# Patient Record
Sex: MALE | Race: WHITE
[De-identification: names, ages, dates, MRNs, and addresses within clinical notes are randomized per-mention and may not be internally consistent; named-entity substitution may affect disease eponyms.]

---

## 2020-08-21 ENCOUNTER — HOSPITAL ENCOUNTER (OUTPATIENT)
Dept: HOSPITAL 19 - ZCOL.LAB | Age: 80
End: 2020-08-21
Payer: MEDICARE

## 2020-08-21 DIAGNOSIS — R41.0: Primary | ICD-10-CM

## 2020-08-21 LAB
ALBUMIN SERPL-MCNC: 4.1 GM/DL (ref 3.5–5)
ALP SERPL-CCNC: 196 U/L (ref 50–136)
ALT SERPL-CCNC: 161 U/L (ref 4–49)
ANION GAP SERPL CALC-SCNC: 7 MMOL/L (ref 7–16)
AST SERPL-CCNC: 176 U/L (ref 15–37)
BASOPHILS # BLD: 0 10*3/UL (ref 0–0.2)
BASOPHILS NFR BLD AUTO: 0.2 % (ref 0–2)
BILIRUB SERPL-MCNC: 1.3 MG/DL (ref 0–1)
BUN SERPL-MCNC: 16 MG/DL (ref 9–20)
CALCIUM SERPL-MCNC: 9.1 MG/DL (ref 8.4–10.2)
CHLORIDE SERPL-SCNC: 99 MMOL/L (ref 98–107)
CO2 SERPL-SCNC: 29 MMOL/L (ref 22–30)
CREAT SERPL-SCNC: 0.81 UMOL/L (ref 0.66–1.25)
EOSINOPHIL # BLD: 0.3 10*3/UL (ref 0–0.7)
EOSINOPHIL NFR BLD: 5.4 % (ref 0–4)
ERYTHROCYTE [DISTWIDTH] IN BLOOD BY AUTOMATED COUNT: 13.1 % (ref 11.5–14.5)
GLUCOSE SERPL-MCNC: 137 MG/DL (ref 74–106)
GRANULOCYTES # BLD AUTO: 63.4 % (ref 42.2–75.2)
HCT VFR BLD AUTO: 45.4 % (ref 42–52)
HGB BLD-MCNC: 15.6 G/DL (ref 13.5–18)
LYMPHOCYTES # BLD: 1.5 10*3/UL (ref 1.2–3.4)
LYMPHOCYTES NFR BLD: 24.7 % (ref 20–51)
MCH RBC QN AUTO: 31 PG (ref 27–31)
MCHC RBC AUTO-ENTMCNC: 34 G/DL (ref 33–37)
MCV RBC AUTO: 91 FL (ref 80–100)
MONOCYTES # BLD: 0.3 10*3/UL (ref 0.1–0.6)
MONOCYTES NFR BLD AUTO: 5.6 % (ref 1.7–9.3)
NEUTROPHILS # BLD: 3.8 10*3/UL (ref 1.4–6.5)
PH UR STRIP.AUTO: 6 [PH] (ref 5–8)
PLATELET # BLD AUTO: 168 K/MM3 (ref 130–400)
PMV BLD AUTO: 10.2 FL (ref 7.4–10.4)
POTASSIUM SERPL-SCNC: 3.5 MMOL/L (ref 3.4–5)
PROT SERPL-MCNC: 7.1 GM/DL (ref 6.4–8.2)
RBC # BLD AUTO: 4.98 M/MM3 (ref 4.2–5.6)
RBC # UR: (no result) /HPF
SODIUM SERPL-SCNC: 136 MMOL/L (ref 137–145)
SP GR UR STRIP.AUTO: 1 (ref 1–1.03)
URN COLLECT METHOD CLASS: (no result)
WBC # UR: (no result) /HPF

## 2020-08-24 ENCOUNTER — HOSPITAL ENCOUNTER (OUTPATIENT)
Dept: HOSPITAL 19 - COL.RAD | Age: 80
End: 2020-08-24
Attending: INTERNAL MEDICINE
Payer: MEDICARE

## 2020-08-24 DIAGNOSIS — R41.0: ICD-10-CM

## 2020-08-24 DIAGNOSIS — K80.20: Primary | ICD-10-CM

## 2020-08-24 DIAGNOSIS — R74.8: ICD-10-CM

## 2020-08-31 ENCOUNTER — HOSPITAL ENCOUNTER (OUTPATIENT)
Dept: HOSPITAL 19 - ZCOL.LAB | Age: 80
End: 2020-08-31
Payer: MEDICARE

## 2020-08-31 DIAGNOSIS — G20: Primary | ICD-10-CM

## 2020-08-31 LAB
ALBUMIN SERPL-MCNC: 4.4 GM/DL (ref 3.5–5)
ALP SERPL-CCNC: 163 U/L (ref 50–136)
ALT SERPL-CCNC: 45 U/L (ref 4–49)
ANION GAP SERPL CALC-SCNC: 10 MMOL/L (ref 7–16)
AST SERPL-CCNC: 40 U/L (ref 15–37)
BASOPHILS # BLD: 0 10*3/UL (ref 0–0.2)
BASOPHILS NFR BLD AUTO: 0.3 % (ref 0–2)
BILIRUB SERPL-MCNC: 1.1 MG/DL (ref 0–1)
BUN SERPL-MCNC: 14 MG/DL (ref 9–20)
CALCIUM SERPL-MCNC: 9.4 MG/DL (ref 8.4–10.2)
CHLORIDE SERPL-SCNC: 102 MMOL/L (ref 98–107)
CO2 SERPL-SCNC: 30 MMOL/L (ref 22–30)
CREAT SERPL-SCNC: 0.88 UMOL/L (ref 0.66–1.25)
EOSINOPHIL # BLD: 0.3 10*3/UL (ref 0–0.7)
EOSINOPHIL NFR BLD: 4.9 % (ref 0–4)
ERYTHROCYTE [DISTWIDTH] IN BLOOD BY AUTOMATED COUNT: 12.4 % (ref 11.5–14.5)
GLUCOSE SERPL-MCNC: 183 MG/DL (ref 74–106)
GRANULOCYTES # BLD AUTO: 62.9 % (ref 42.2–75.2)
HCT VFR BLD AUTO: 47 % (ref 42–52)
HGB BLD-MCNC: 16.5 G/DL (ref 13.5–18)
LYMPHOCYTES # BLD: 1.6 10*3/UL (ref 1.2–3.4)
LYMPHOCYTES NFR BLD: 25.5 % (ref 20–51)
MCH RBC QN AUTO: 31 PG (ref 27–31)
MCHC RBC AUTO-ENTMCNC: 35 G/DL (ref 33–37)
MCV RBC AUTO: 89 FL (ref 80–100)
MONOCYTES # BLD: 0.4 10*3/UL (ref 0.1–0.6)
MONOCYTES NFR BLD AUTO: 5.9 % (ref 1.7–9.3)
NEUTROPHILS # BLD: 3.8 10*3/UL (ref 1.4–6.5)
PLATELET # BLD AUTO: 188 K/MM3 (ref 130–400)
PMV BLD AUTO: 9.5 FL (ref 7.4–10.4)
POTASSIUM SERPL-SCNC: 3.8 MMOL/L (ref 3.4–5)
PROT SERPL-MCNC: 7.1 GM/DL (ref 6.4–8.2)
RBC # BLD AUTO: 5.28 M/MM3 (ref 4.2–5.6)
SODIUM SERPL-SCNC: 143 MMOL/L (ref 137–145)

## 2020-09-03 ENCOUNTER — HOSPITAL ENCOUNTER (OUTPATIENT)
Dept: HOSPITAL 19 - SDCO | Age: 80
LOS: 1 days | Discharge: HOME | End: 2020-09-04
Attending: SURGERY
Payer: MEDICARE

## 2020-09-03 VITALS — HEART RATE: 89 BPM | DIASTOLIC BLOOD PRESSURE: 71 MMHG | SYSTOLIC BLOOD PRESSURE: 117 MMHG

## 2020-09-03 VITALS — DIASTOLIC BLOOD PRESSURE: 87 MMHG | HEART RATE: 88 BPM | SYSTOLIC BLOOD PRESSURE: 139 MMHG

## 2020-09-03 VITALS — HEART RATE: 84 BPM | DIASTOLIC BLOOD PRESSURE: 97 MMHG | SYSTOLIC BLOOD PRESSURE: 176 MMHG | TEMPERATURE: 97.6 F

## 2020-09-03 VITALS — HEART RATE: 88 BPM | DIASTOLIC BLOOD PRESSURE: 63 MMHG | SYSTOLIC BLOOD PRESSURE: 130 MMHG

## 2020-09-03 VITALS — HEART RATE: 50 BPM | DIASTOLIC BLOOD PRESSURE: 99 MMHG | SYSTOLIC BLOOD PRESSURE: 179 MMHG | TEMPERATURE: 98.2 F

## 2020-09-03 VITALS — DIASTOLIC BLOOD PRESSURE: 73 MMHG | HEART RATE: 88 BPM | SYSTOLIC BLOOD PRESSURE: 115 MMHG

## 2020-09-03 VITALS — TEMPERATURE: 98.1 F | SYSTOLIC BLOOD PRESSURE: 160 MMHG | HEART RATE: 83 BPM | DIASTOLIC BLOOD PRESSURE: 102 MMHG

## 2020-09-03 VITALS — DIASTOLIC BLOOD PRESSURE: 100 MMHG | TEMPERATURE: 97.7 F | HEART RATE: 87 BPM | SYSTOLIC BLOOD PRESSURE: 159 MMHG

## 2020-09-03 VITALS — HEART RATE: 91 BPM | SYSTOLIC BLOOD PRESSURE: 132 MMHG | DIASTOLIC BLOOD PRESSURE: 85 MMHG | TEMPERATURE: 98.6 F

## 2020-09-03 VITALS — DIASTOLIC BLOOD PRESSURE: 69 MMHG | SYSTOLIC BLOOD PRESSURE: 119 MMHG | HEART RATE: 87 BPM

## 2020-09-03 VITALS — BODY MASS INDEX: 30.66 KG/M2 | WEIGHT: 195.33 LBS | HEIGHT: 67 IN

## 2020-09-03 DIAGNOSIS — F32.9: ICD-10-CM

## 2020-09-03 DIAGNOSIS — G25.81: ICD-10-CM

## 2020-09-03 DIAGNOSIS — G20: ICD-10-CM

## 2020-09-03 DIAGNOSIS — E03.9: ICD-10-CM

## 2020-09-03 DIAGNOSIS — Z96.82: ICD-10-CM

## 2020-09-03 DIAGNOSIS — Z79.899: ICD-10-CM

## 2020-09-03 DIAGNOSIS — K80.10: Primary | ICD-10-CM

## 2020-09-03 DIAGNOSIS — Z20.828: ICD-10-CM

## 2020-09-03 DIAGNOSIS — F41.9: ICD-10-CM

## 2020-09-03 DIAGNOSIS — Z85.828: ICD-10-CM

## 2020-09-03 DIAGNOSIS — F02.80: ICD-10-CM

## 2020-09-03 DIAGNOSIS — R79.89: ICD-10-CM

## 2020-09-03 NOTE — NUR
PATIENT IS ORIENTED BUT DROWSY POST OP. PATIENT HAS HX OF PARKINSON'S,
IMPLANTED STIMULATOR TURNED BACK ON IN PACU. VSS. DENIES PAIN. ABD LAP SITES
X5 ARE CD&I WITH SWIFTSET CLOSURE. ABD IS SOFT WITH POSITIVE BOWL SOUNDS. NO
C/O N/V. PATIENT VOIDED 140CC UPON ARRIVAL TO ROOM USING URINAL. IV FLUIDS
INFUSING INTO LEFT HAND VIA PUMP. HEAD TO TOE ASSESSMENT COMPLETE. DAUGHTER AT
BEDSIDE. CALL LIGHT IN REACH.

## 2020-09-03 NOTE — NUR
Patient sitting up in bed, has complaint of a headache that he has had
throughout the day. A&O, slow to answer and looks for guidance from daughter,
who is at the bedside. VSS, BP elevated, doctor aware. IV CDI, fluids
infusing. Patient doesnt have much of an appetite, daughter encouraging
patient to eat. Lap site abdomenx5 CDI. No further needs expressed from the
patient. Call light within reach. Bed alarm on

## 2020-09-03 NOTE — NUR
PT HAS GOWN OFF, HAS PULLED IV OUT, ANGIOCATH INTACT. HAS PICKED LEFT GLUE
FROM PART OF LEFT ABD ROBOTIC SITE. IS ORIENTED, BUT FORGETFUL. ASSISTED TO
BATHROOM, GAIT UNSTEADY REQUIRES 1-2 ASSIST. VOIDS AND BACK TO BED. BED ALARM
ON FOR SAFETY. PLACED STERI STRIPS TO ROBOTIC SITE ON LEFT.

## 2020-09-03 NOTE — NUR
PT IS AGAIN WITHOUT HIS GOWN ON. TAKES HS MEDS ONE AT A TIME AND NEW SL PLACED
TO RIGHT FOREARM. PT HAS PICKED AT [facility] ROBOTIC SITE, GLUE IS INTACT. PT
APOLOGIZES FOR BEING "EXPOSED". RE-ORIENTED AT THIS TIME.

## 2020-09-04 VITALS — SYSTOLIC BLOOD PRESSURE: 160 MMHG | HEART RATE: 79 BPM | DIASTOLIC BLOOD PRESSURE: 100 MMHG | TEMPERATURE: 98.7 F

## 2020-09-04 VITALS — HEART RATE: 79 BPM | SYSTOLIC BLOOD PRESSURE: 131 MMHG | TEMPERATURE: 98.6 F | DIASTOLIC BLOOD PRESSURE: 96 MMHG

## 2020-09-04 NOTE — NUR
PT HAS GOWN OFF, IS INCONTINENT OF URINE AND DEPENDS CHANGED. PLACED BANDAIDS
OVER 2 OF THE ROBOTIC SITES.

## 2020-09-04 NOTE — NUR
Patient assisted to the bathroom, he voided, but did miss toliet. Patient
ambulated to the chair with walker. Sat up in chair for breakfast. High fall
risk protocol followed

## 2020-09-04 NOTE — NUR
Assessment charted. PT resting in chair at side of bed. Daughter at bedside,
thrilled with pt's improvements since yesterday. Pt able to answer all
orientation questions except year. Pain is 3/10 to LUQ, has mild headache,
refusing any pharmacological interventions at this time.  Denies needs, INT to
RW. Will continue to moitor.

## 2020-09-04 NOTE — NUR
Discharge teaching completed at this time. PT INT dc'd, tip tinact. Pt
received dsischarge packet, reviewed f/u and discussed bathing restrictions.
Pt left wtih all belongings, escorted out via w/c by myself, daughter to drive
home, criteria met.

## 2020-09-04 NOTE — NUR
Report received from HOLLIS Walton. Pt in bed resting, bed alarm on. Pts
umbilical lap site and LUQ lap site have been picked overnight by pt despite
efforts to minimalize.  3 other lap sites are doing well. Pt has TV on to
news, denies needs, will continue to monitor.

## 2020-09-23 ENCOUNTER — HOSPITAL ENCOUNTER (OUTPATIENT)
Dept: HOSPITAL 19 - COL.RAD | Age: 80
End: 2020-09-23
Payer: MEDICARE

## 2020-09-23 DIAGNOSIS — G20: Primary | ICD-10-CM

## 2020-09-23 DIAGNOSIS — Z96.82: ICD-10-CM

## 2020-09-23 DIAGNOSIS — N39.46: ICD-10-CM

## 2021-02-12 ENCOUNTER — HOSPITAL ENCOUNTER (EMERGENCY)
Dept: HOSPITAL 19 - COL.ER | Age: 81
Discharge: HOME | End: 2021-02-12
Attending: FAMILY MEDICINE
Payer: MEDICARE

## 2021-02-12 VITALS — TEMPERATURE: 96.6 F | DIASTOLIC BLOOD PRESSURE: 118 MMHG | HEART RATE: 61 BPM | SYSTOLIC BLOOD PRESSURE: 167 MMHG

## 2021-02-12 VITALS — BODY MASS INDEX: 32.21 KG/M2 | WEIGHT: 200.4 LBS | HEIGHT: 65.98 IN

## 2021-02-12 DIAGNOSIS — Z79.890: ICD-10-CM

## 2021-02-12 DIAGNOSIS — Z88.8: ICD-10-CM

## 2021-02-12 DIAGNOSIS — F02.80: ICD-10-CM

## 2021-02-12 DIAGNOSIS — G20: Primary | ICD-10-CM

## 2021-02-12 LAB
ALBUMIN SERPL-MCNC: 4.4 GM/DL (ref 3.5–5)
ALP SERPL-CCNC: 66 U/L (ref 50–136)
ALT SERPL-CCNC: 4 U/L (ref 4–49)
ANION GAP SERPL CALC-SCNC: 4 MMOL/L (ref 7–16)
AST SERPL-CCNC: 27 U/L (ref 15–37)
BASOPHILS # BLD: 0 10*3/UL (ref 0–0.2)
BASOPHILS NFR BLD AUTO: 0.4 % (ref 0–2)
BILIRUB SERPL-MCNC: 0.8 MG/DL (ref 0–1)
BUN SERPL-MCNC: 20 MG/DL (ref 9–20)
CALCIUM SERPL-MCNC: 9.3 MG/DL (ref 8.4–10.2)
CHLORIDE SERPL-SCNC: 102 MMOL/L (ref 98–107)
CO2 SERPL-SCNC: 35 MMOL/L (ref 22–30)
CREAT SERPL-SCNC: 0.89 UMOL/L (ref 0.66–1.25)
CRP SERPL-MCNC: < 0.5 MG/DL (ref 0–0.9)
EOSINOPHIL # BLD: 0.4 10*3/UL (ref 0–0.7)
EOSINOPHIL NFR BLD: 4.8 % (ref 0–4)
ERYTHROCYTE [DISTWIDTH] IN BLOOD BY AUTOMATED COUNT: 12.3 % (ref 11.5–14.5)
GLUCOSE SERPL-MCNC: 108 MG/DL (ref 74–106)
GRANULOCYTES # BLD AUTO: 65.4 % (ref 42.2–75.2)
HCT VFR BLD AUTO: 46.3 % (ref 42–52)
HGB BLD-MCNC: 16.2 G/DL (ref 13.5–18)
LYMPHOCYTES # BLD: 1.7 10*3/UL (ref 1.2–3.4)
LYMPHOCYTES NFR BLD: 22.9 % (ref 20–51)
MCH RBC QN AUTO: 32 PG (ref 27–31)
MCHC RBC AUTO-ENTMCNC: 35 G/DL (ref 33–37)
MCV RBC AUTO: 91 FL (ref 80–100)
MONOCYTES # BLD: 0.4 10*3/UL (ref 0.1–0.6)
MONOCYTES NFR BLD AUTO: 6 % (ref 1.7–9.3)
NEUTROPHILS # BLD: 4.8 10*3/UL (ref 1.4–6.5)
PH UR STRIP.AUTO: 5 [PH] (ref 5–8)
PLATELET # BLD AUTO: 161 K/MM3 (ref 130–400)
PMV BLD AUTO: 8.9 FL (ref 7.4–10.4)
POTASSIUM SERPL-SCNC: 4 MMOL/L (ref 3.4–5)
PROT SERPL-MCNC: 7.2 GM/DL (ref 6.4–8.2)
RBC # BLD AUTO: 5.1 M/MM3 (ref 4.2–5.6)
RBC # UR: (no result) /HPF
SODIUM SERPL-SCNC: 140 MMOL/L (ref 137–145)
SP GR UR STRIP.AUTO: 1.02 (ref 1–1.03)
SQUAMOUS # URNS: (no result) /HPF
URN COLLECT METHOD CLASS: (no result)

## 2021-02-17 ENCOUNTER — HOSPITAL ENCOUNTER (EMERGENCY)
Dept: HOSPITAL 19 - COL.ER | Age: 81
Discharge: HOME | End: 2021-02-17
Attending: EMERGENCY MEDICINE
Payer: MEDICARE

## 2021-02-17 VITALS — HEART RATE: 50 BPM | DIASTOLIC BLOOD PRESSURE: 91 MMHG | SYSTOLIC BLOOD PRESSURE: 136 MMHG

## 2021-02-17 VITALS — WEIGHT: 190.48 LBS | HEIGHT: 72 IN | BODY MASS INDEX: 25.8 KG/M2

## 2021-02-17 VITALS — TEMPERATURE: 97.6 F

## 2021-02-17 DIAGNOSIS — G20: Primary | ICD-10-CM

## 2021-02-17 DIAGNOSIS — Z79.890: ICD-10-CM

## 2021-02-17 DIAGNOSIS — Z88.8: ICD-10-CM

## 2021-02-17 DIAGNOSIS — E03.9: ICD-10-CM

## 2021-02-17 DIAGNOSIS — F02.80: ICD-10-CM

## 2021-02-17 LAB
ALBUMIN SERPL-MCNC: 4.1 GM/DL (ref 3.5–5)
ALP SERPL-CCNC: 63 U/L (ref 50–136)
ALT SERPL-CCNC: 5 U/L (ref 4–49)
ANION GAP SERPL CALC-SCNC: 5 MMOL/L (ref 7–16)
AST SERPL-CCNC: 40 U/L (ref 15–37)
BASOPHILS # BLD: 0 10*3/UL (ref 0–0.2)
BASOPHILS NFR BLD AUTO: 0.3 % (ref 0–2)
BILIRUB SERPL-MCNC: 1.2 MG/DL (ref 0–1)
BUN SERPL-MCNC: 17 MG/DL (ref 9–20)
CALCIUM SERPL-MCNC: 9.3 MG/DL (ref 8.4–10.2)
CHLORIDE SERPL-SCNC: 100 MMOL/L (ref 98–107)
CO2 SERPL-SCNC: 34 MMOL/L (ref 22–30)
CREAT SERPL-SCNC: 0.88 UMOL/L (ref 0.66–1.25)
CRP SERPL-MCNC: < 0.5 MG/DL (ref 0–0.9)
EOSINOPHIL # BLD: 0.4 10*3/UL (ref 0–0.7)
EOSINOPHIL NFR BLD: 6.3 % (ref 0–4)
ERYTHROCYTE [DISTWIDTH] IN BLOOD BY AUTOMATED COUNT: 11.9 % (ref 11.5–14.5)
GLUCOSE SERPL-MCNC: 98 MG/DL (ref 74–106)
GRANULOCYTES # BLD AUTO: 54.2 % (ref 42.2–75.2)
HCT VFR BLD AUTO: 45.8 % (ref 42–52)
HGB BLD-MCNC: 16.5 G/DL (ref 13.5–18)
LYMPHOCYTES # BLD: 1.8 10*3/UL (ref 1.2–3.4)
LYMPHOCYTES NFR BLD: 30.6 % (ref 20–51)
MCH RBC QN AUTO: 31 PG (ref 27–31)
MCHC RBC AUTO-ENTMCNC: 36 G/DL (ref 33–37)
MCV RBC AUTO: 87 FL (ref 80–100)
MONOCYTES # BLD: 0.5 10*3/UL (ref 0.1–0.6)
MONOCYTES NFR BLD AUTO: 8.1 % (ref 1.7–9.3)
NEUTROPHILS # BLD: 3.2 10*3/UL (ref 1.4–6.5)
PH UR STRIP.AUTO: 6 [PH] (ref 5–8)
PLATELET # BLD AUTO: 159 K/MM3 (ref 130–400)
PMV BLD AUTO: 9.4 FL (ref 7.4–10.4)
POTASSIUM SERPL-SCNC: 3.5 MMOL/L (ref 3.4–5)
PROT SERPL-MCNC: 6.9 GM/DL (ref 6.4–8.2)
RBC # BLD AUTO: 5.25 M/MM3 (ref 4.2–5.6)
RBC # UR: (no result) /HPF
SODIUM SERPL-SCNC: 140 MMOL/L (ref 137–145)
SP GR UR STRIP.AUTO: 1.02 (ref 1–1.03)
TROPONIN I SERPL-MCNC: < 0.012 NG/ML (ref 0–0.04)
URN COLLECT METHOD CLASS: (no result)
UROBILINOGEN UR STRIP.AUTO-MCNC: >=4 MG/DL

## 2021-02-18 ENCOUNTER — HOSPITAL ENCOUNTER (OUTPATIENT)
Dept: HOSPITAL 19 - ZCOL.LAB | Age: 81
End: 2021-02-18
Payer: MEDICARE

## 2021-02-18 DIAGNOSIS — G24.9: ICD-10-CM

## 2021-02-18 DIAGNOSIS — R41.82: Primary | ICD-10-CM
